# Patient Record
Sex: MALE | Race: WHITE | NOT HISPANIC OR LATINO | Employment: STUDENT | URBAN - METROPOLITAN AREA
[De-identification: names, ages, dates, MRNs, and addresses within clinical notes are randomized per-mention and may not be internally consistent; named-entity substitution may affect disease eponyms.]

---

## 2022-03-28 ENCOUNTER — TELEPHONE (OUTPATIENT)
Dept: FAMILY MEDICINE | Facility: CLINIC | Age: 22
End: 2022-03-28

## 2022-03-28 ENCOUNTER — OFFICE VISIT (OUTPATIENT)
Dept: FAMILY MEDICINE | Facility: CLINIC | Age: 22
End: 2022-03-28
Payer: OTHER GOVERNMENT

## 2022-03-28 VITALS
BODY MASS INDEX: 24.46 KG/M2 | DIASTOLIC BLOOD PRESSURE: 80 MMHG | RESPIRATION RATE: 16 BRPM | TEMPERATURE: 98 F | WEIGHT: 170.88 LBS | OXYGEN SATURATION: 98 % | HEIGHT: 70 IN | SYSTOLIC BLOOD PRESSURE: 124 MMHG | HEART RATE: 78 BPM

## 2022-03-28 DIAGNOSIS — Z02.83 ENCOUNTER FOR BLOOD-ALCOHOL AND BLOOD-DRUG TEST: ICD-10-CM

## 2022-03-28 DIAGNOSIS — J02.9 PHARYNGITIS, UNSPECIFIED ETIOLOGY: Primary | ICD-10-CM

## 2022-03-28 LAB
CTP QC/QA: YES
CTP QC/QA: YES
S PYO RRNA THROAT QL PROBE: NEGATIVE
SARS-COV-2 RDRP RESP QL NAA+PROBE: NEGATIVE

## 2022-03-28 PROCEDURE — 99203 OFFICE O/P NEW LOW 30 MIN: CPT | Mod: S$PBB,,, | Performed by: FAMILY MEDICINE

## 2022-03-28 PROCEDURE — 99999 PR PBB SHADOW E&M-NEW PATIENT-LVL IV: CPT | Mod: PBBFAC,,, | Performed by: FAMILY MEDICINE

## 2022-03-28 PROCEDURE — 87880 STREP A ASSAY W/OPTIC: CPT | Mod: PBBFAC,PO | Performed by: FAMILY MEDICINE

## 2022-03-28 PROCEDURE — 99203 PR OFFICE/OUTPT VISIT, NEW, LEVL III, 30-44 MIN: ICD-10-PCS | Mod: S$PBB,,, | Performed by: FAMILY MEDICINE

## 2022-03-28 PROCEDURE — U0002 COVID-19 LAB TEST NON-CDC: HCPCS | Mod: PBBFAC,PO | Performed by: FAMILY MEDICINE

## 2022-03-28 PROCEDURE — 99999 PR PBB SHADOW E&M-NEW PATIENT-LVL IV: ICD-10-PCS | Mod: PBBFAC,,, | Performed by: FAMILY MEDICINE

## 2022-03-28 PROCEDURE — 99204 OFFICE O/P NEW MOD 45 MIN: CPT | Mod: PBBFAC,PO | Performed by: FAMILY MEDICINE

## 2022-03-28 RX ORDER — PREDNISONE 20 MG/1
40 TABLET ORAL DAILY
Qty: 10 TABLET | Refills: 0 | Status: SHIPPED | OUTPATIENT
Start: 2022-03-28

## 2022-03-28 RX ORDER — AMOXICILLIN 875 MG/1
875 TABLET, FILM COATED ORAL 2 TIMES DAILY
Qty: 20 TABLET | Refills: 0 | Status: SHIPPED | OUTPATIENT
Start: 2022-03-28 | End: 2022-04-07

## 2022-03-28 NOTE — PROGRESS NOTES
"Subjective:       Patient ID: Brandon Ortiz is a 22 y.o. male.    Chief Complaint: No chief complaint on file.      HPI Comments:       Current Outpatient Medications:     amoxicillin (AMOXIL) 875 MG tablet, Take 1 tablet (875 mg total) by mouth 2 (two) times daily. for 10 days, Disp: 20 tablet, Rfl: 0    naproxen (NAPROSYN) 375 MG tablet, Take 1 tablet (375 mg total) by mouth 2 (two) times daily with meals., Disp: 60 tablet, Rfl: 0    predniSONE (DELTASONE) 20 MG tablet, Take 2 tablets (40 mg total) by mouth once daily., Disp: 10 tablet, Rfl: 0        This my 1st time seeing this patient.  He is recently return from [employer], stationed in [place].  Complains of a 2 to three-day history of sinus symptoms and cough.  Also has developed a very sore throat over the last 24 hours.  Sweats,  but no known fever or chills.  No GI symptoms     Sick contacts but nothing specific.  Some history of seasonal allergies.  Taking cough syrup.  Nonsmoker, non vapor.  No home testing.  Had COVID last month     Made the appointment earlier because [employer] requires a referral from me to an institution called [facility].  Because he failed marijuana test.  Says he rarely uses    Review of Systems   Constitutional: Positive for diaphoresis. Negative for activity change, appetite change and fever.   HENT: Positive for congestion, rhinorrhea, sinus pressure and trouble swallowing. Negative for sore throat.    Respiratory: Positive for cough. Negative for shortness of breath.    Cardiovascular: Negative for chest pain.   Gastrointestinal: Negative for abdominal pain, diarrhea and nausea.   Genitourinary: Negative for difficulty urinating.   Musculoskeletal: Negative for arthralgias and myalgias.   Neurological: Negative for dizziness and headaches.       Objective:      Vitals:    03/28/22 0744   BP: 124/80   Pulse: 78   Resp: 16   Temp: 97.5 °F (36.4 °C)   SpO2: 98%   Weight: 77.5 kg (170 lb 13.7 oz)   Height: 5' 10" " (1.778 m)   PainSc: 0-No pain     Physical Exam  Vitals and nursing note reviewed.   Constitutional:       General: He is not in acute distress.     Appearance: He is well-developed. He is ill-appearing. He is not diaphoretic.   HENT:      Head: Normocephalic.      Right Ear: Tympanic membrane, ear canal and external ear normal.      Left Ear: Tympanic membrane, ear canal and external ear normal.      Nose: Mucosal edema and rhinorrhea present.      Mouth/Throat:      Pharynx: Pharyngeal swelling and posterior oropharyngeal erythema present. No oropharyngeal exudate or uvula swelling.      Tonsils: No tonsillar exudate.      Comments: Very erythematous posterior pharynx  Neck:      Thyroid: No thyromegaly.   Cardiovascular:      Rate and Rhythm: Normal rate and regular rhythm.      Heart sounds: Normal heart sounds. No murmur heard.  Pulmonary:      Effort: Pulmonary effort is normal.      Breath sounds: Normal breath sounds. No wheezing or rales.   Abdominal:      General: There is no distension.      Palpations: Abdomen is soft.   Musculoskeletal:      Cervical back: Neck supple.   Lymphadenopathy:      Cervical: No cervical adenopathy.   Skin:     General: Skin is warm and dry.   Neurological:      Mental Status: He is alert and oriented to person, place, and time.   Psychiatric:         Behavior: Behavior normal.         Thought Content: Thought content normal.         Judgment: Judgment normal.         Assessment:       1. Pharyngitis, unspecified etiology    2. Encounter for blood-alcohol and blood-drug test        Plan:   Pharyngitis, unspecified etiology  Comments:  Likely strep pharyngitis.  Amoxil.  Prednisone for pain.  Also rest and fluids, warm saltwater gargles, honey  Orders:  -     POCT rapid strep A  -     POCT COVID-19 Rapid Screening  -     Strep A culture, throat    Encounter for blood-alcohol and blood-drug test  Comments:  Referral made  Orders:  -     Ambulatory referral/consult to Addiction  Specialist; Future; Expected date: 03/28/2022    Other orders  -     amoxicillin (AMOXIL) 875 MG tablet; Take 1 tablet (875 mg total) by mouth 2 (two) times daily. for 10 days  Dispense: 20 tablet; Refill: 0  -     predniSONE (DELTASONE) 20 MG tablet; Take 2 tablets (40 mg total) by mouth once daily.  Dispense: 10 tablet; Refill: 0

## 2022-03-28 NOTE — TELEPHONE ENCOUNTER
Spoke with patient letting him know for some reason the RX was not sent so I called them in to [facility] for [facility]. patient verbalized understanding

## 2022-03-28 NOTE — TELEPHONE ENCOUNTER
----- Message from Lissa Lloyd sent at 3/28/2022  3:18 PM CDT -----  Contact: Self   Pt is requesting a call regarding rx that were prescribed today. Pt went to pharmacy twice today and pharmacy states they are waiting for response from office before they can fill rxes. Please advise

## 2022-03-30 ENCOUNTER — TELEPHONE (OUTPATIENT)
Dept: PSYCHIATRY | Facility: CLINIC | Age: 22
End: 2022-03-30
Payer: OTHER GOVERNMENT

## 2022-03-30 NOTE — TELEPHONE ENCOUNTER
Call and spoke with Brandon to get him scheduled. He state the provider misunderstood him that he doesn't need a psychiatry appointment so I changed the referral states to denied.

## 2022-03-31 ENCOUNTER — TELEPHONE (OUTPATIENT)
Dept: FAMILY MEDICINE | Facility: CLINIC | Age: 22
End: 2022-03-31
Payer: OTHER GOVERNMENT

## 2022-03-31 NOTE — TELEPHONE ENCOUNTER
----- Message from Gloria Hector sent at 3/31/2022  1:41 PM CDT -----  Contact: Patient  Patient called to consult with nurse or staff regarding a referral for outpatient therapy for addiction. He states he hasn't been contacted to get scheduled and would like to know the status of this. He can be reached at [phone]. Thanks/

## 2022-03-31 NOTE — TELEPHONE ENCOUNTER
I seen where pt may have been contacted. I cannot access the chart. Please let me know if I sent this in error. Thanks

## 2022-04-01 ENCOUNTER — TELEPHONE (OUTPATIENT)
Dept: FAMILY MEDICINE | Facility: CLINIC | Age: 22
End: 2022-04-01
Payer: OTHER GOVERNMENT

## 2022-04-01 NOTE — TELEPHONE ENCOUNTER
Please call patient and let him know he can contact the Outpatient Addiction place he was referred to.